# Patient Record
(demographics unavailable — no encounter records)

---

## 2020-01-01 NOTE — DIS
DATE OF ADMISSION:  2020



DATE OF DISCHARGE:  2020



RESIDENT:  Camila Garza DO.



DISCHARGE ATTENDING:  Nahid Henry MD.



CONSULTS:  None.



PROCEDURES:  Lumbar puncture.



PRIMARY DIAGNOSIS:   fever of unknown origin likely viral.



SECONDARY DIAGNOSIS:  Oral thrush.



DISCHARGE MEDICATIONS:  

1. Nystatin oral suspension 2 mL SSW q.i.d. 3 days.

2. Acetaminophen 50 mg p.o. q.4 hours. 



Discontinued Medications:  None.



HISTORY OF PRESENT ILLNESS/HOSPITAL COURSE:  The patient is a 16-day-old male who

presented with a chief complaint per mom with fever of 100.5 rectally at home,

noting patient was acting normal with normal p.o. intake, output and no symptoms.

The patient was born on 2020, at 0701 to a 22-year-old G4, P2-1-0-3 female via

 at 37.4 weeks gestational age.  Apgars were 8 and 9 and  course was

uneventful. 



In the ED, the patient was started on ampicillin, gentamicin. LP and blood culture

were done.  Chest x-ray, UA, and labs were all within normal limits.  Patient did

develop a fever overnight that was treated with Tylenol and remained afebrile the

rest of his stay.  The patient was given nystatin for oral thrush.  RVP was

negative.  Urine cultures, blood cultures, and CSF cultures were negative 48 hours

and antibiotics were stopped and the patient was discharged home with acetaminophen

p.r.n. and nystatin for 3 more days with followup with his primary care physician in

3 days. 



DISPOSITION:  Stable.



DISCHARGE INSTRUCTIONS:  Location:  Home. 



Diet:  Regular. 



Activity:  Ad bandar. 



Followup:  Follow up with your pediatrician in 3 days.







Job ID:  [id number]

## 2020-01-01 NOTE — PDOC.PED
Subjective:


Patient is resting comfortably in bed with mom. No acute events overnight. 

Feeding, voiding and stooling normally. Denies fever/chills, difficulty b

reathing.








Objective:


                             Vital Signs (12 hours)











  Temp Pulse Resp Pulse Ox


 


 10/14/20 05:57  98.7 F  152  50  98


 


 10/14/20 00:45  98.3 F  156  52  100


 


 10/13/20 19:00  98.5 F  144  64 H  99








                                     Weight











Weight                         3.26 kg














                                        











 10/12/20 10/13/20 10/14/20





 06:59 06:59 06:59


 


Intake Total  124 680


 


Output Total  64 510


 


Balance  60 170














Lab/Radiology


Result Diagrams: 


                                 10/12/20 22:01





                                 10/12/20 22:01


                             Lab Results - 24 Hours











  10/12/20 10/12/20 10/12/20





  23:25 23:25 22:46


 


Fluid Diff Path Review     


 


SARS-CoV-2 (PCR)    Not Detected








                                        











  10/12/20





  22:01


 


Total Bilirubin  5.4














Phys Exam





- Physical Examination


Constitutional: NAD


HEENT: PERRLA, moist MMs


Respiratory: no wheezing, clear to auscultation bilateral


Cardiovascular: RRR, no significant murmur


Gastrointestinal: soft, non-tender, positive bowel sounds


Musculoskeletal: no edema


Neurological: moves all 4 limbs


Skin: no rash





Assessment/Plan:


(1) Fever of unknown origin


Status: Acute   


#  fever 2/2 unknown source


-reported fever of 100.5 @ home per mom, 101.7 here initially, no fever >24 hrs


-UA, CXR neg


-RVP neg


-LP wnl


-CSF cultures, UCx, BCx negative so far, 48 hours tonight at 2325


-Ampicillin and Gentamicin give in ED (10/12), will continue pending results/s

ensitivities 


-Tylenol PRN fever


-Strict I/Os





#Thrush


-treating with Nystatin





Dispo: anticipated LOS >48 hours, likely d/c tomorrow morning


Diet: Formula, Similac Sensitive


Code: FULL


Fluids: KVO


PCP- [facility]








Addendum - Attending





- Attending Attestation


Date/Time: 10/14/20 9366





I personally evaluated the patient and discussed the management with Dr. Garza.


I agree with the History, Examination, Assessment and Plan documented above with

any addition or exceptions noted below.





continue abx until cx neg at 48 hrs. Doing well otherwise. no concerns. likely 

d/c tomorrow.

## 2020-01-01 NOTE — RAD
CHEST 2 VIEWS:

 

Date:  2020

 

HISTORY:  

Fever. 

 

FINDINGS:

The cardiothymic silhouette is within normal limits. Lungs are clear of any infiltrative process. No 
bony findings. 

 

IMPRESSION: 

No active intrathoracic disease. 

 

 

POS: OFF

## 2020-01-01 NOTE — PDOC.FPRHP
- History of Present Illness


Chief Complaint: fever


History of Present Illness: 





Pt is a 14 day old M who presents with chief complaint as per mom of fever prior

to arrival to ED. She states patient has been acting normal, with adequate PO 

intake, output, no rash, vomiting or diarrhea. The only thing she notes is that 

his tongue is white which started about a day ago. She just notes a rectal 

temperature of 100.5 which prompted her to come to the ED. She did not give 

patient any medication. Patient has 4 dirty diapers a day and 6 wet. He feeds 

2oz q2-3 hours of Similar Sensitive. Pregnancy was uncomplicated. Pt was born on

20 @ 0701 to a 23 yo  F via  at 37.4 wga. APGARS 8/9 and 

course was uneventful. 


ED Course: 





Ampicillin, Gentamicin, Tylenol


CXR neg, UA neg, labs WNL


LP and blood culture results pending





- Allergies/Adverse Reactions


                                    Allergies











Allergy/AdvReac Type Severity Reaction Status Date / Time


 


No Known Allergies Allergy   Verified 10/13/20 01:56














- Home Medications


                                        











 Medication  Instructions  Recorded  Confirmed  Type


 


No Known  09/29/20 10/13/20 History














- History


PMHx:


 see birth history above 





PSHx: 


negative





FHx:


 non contributory





Social:


 negative








- Review of Systems


General: reports: fever/chills.  denies: weight/appetite/sleep changes


ENT: denies: nasal congestion


Respiratory: denies: cough, congestion


Cardiovascular: denies: palpitation, edema


Gastrointestinal: denies: vomiting, diarrhea, GI bleeding


Skin: denies: rashes, lesions


Musculoskeletal: denies: stiffness, swelling


Neurological: denies: syncope, seizure





- Vital signs


HR: 148 RR: 48 Tmax: 98.8 Pox: 100% on RA  Wt: 3.27kg   








- Physical Exam


Constitutional: NAD, awake, alert and oriented, well developed


HEENT: normocephalic and atraumatic, EOMI, conjunctiva clear, TM's clear and 

intact, other (thrush on tongue)


Neck: supple


Heart: RRR, no murmurs/rubs/gallops


Lungs: CTAB, no respiratory distress, good air movement


Abdomen: soft, bowel sounds present, no masses/distention


Neurological: no focal deficit


Skin: no rash/lesions, good turgor


Heme/Lymphatic: no unusual bruising or bleeding





FMR H&P: Results





- Labs


Result Diagrams: 


                                 10/12/20 22:01





                                 10/12/20 22:


Lab results: 


                                        











WBC  9.6 thou/uL (9.0-30.0)   10/12/20  22:    


 


Hgb  17.0 g/dL (14.5-22.5)   10/12/20  22:    


 


Hct  50.1 % (44.0-64.0)   10/12/20  22:    


 


MCV  101.0 fL (96.0-116.0)   10/12/20  22:    


 


Plt Count  119 thou/uL (130-400)  L  10/12/20  22:    


 


Band Neuts % (Manual)  1 % (10-18)  L  10/12/20  22:    


 


Sodium  137 mmol/L (133-146)   10/12/20  22:    


 


Potassium  5.9 mmol/L (3.7-5.9)   10/12/20  22:    


 


Chloride  106 mmol/L ()   10/12/20  22:    


 


Carbon Dioxide  22 mmol/L (20-28)   10/12/20  22:    


 


BUN  8 mg/dL (5.1-16.8)   10/12/20  22:    


 


Creatinine  0.42 mg/dL (0.7-1.3)  L  10/12/20  22:    


 


Glucose  79 mg/dL (50-80)   10/12/20  22:    


 


Calcium  9.6 mg/dL (9.0-11.0)   10/12/20  22:    


 


Total Bilirubin  5.4 mg/dL (4.0-8.0)   10/12/20  22:    


 


AST  37 U/L (20-60)   10/12/20  22:    


 


ALT  26 U/L (8-55)   10/12/20  22:    


 


Alkaline Phosphatase  271 U/L (120-360)   10/12/20  22:    


 


C-Reactive Protein  Less than  0.50 mg/dL (= or < 0.5)   10/12/20  22:    


 


Serum Total Protein  5.8 g/dL (4.4-7.6)   10/12/20  22:    


 


Albumin  3.7 g/dL (3.8-5.4)  L  10/12/20  22:    


 


Urine Ketones  Negative mg/dL (Negative)   10/12/20  22:03    


 


Urine Blood  Negative  (Negative)   10/12/20  22:03    


 


Urine Nitrite  Negative  (Negative)   10/12/20  22:03    


 


Ur Leukocyte Esterase  Negative Stepan/uL (Negative)   10/12/20  22:03    














FMR H&P: A/P





- Plan





#  fever 2/2 unknown source


-reported fever of 100.5 per mom


-UA, CXR neg


-blood cultures and LP pending


-Ampicillin and Gentamicin give in ED (10/12), will continue pending 

results/sensitivities 


-Tylenol PRN fever





#Thrush


-treat with Nystatin





Dispo: admit to inpatient peds, anticipated LOS >48 hours


Diet: Formula, Similac Sensitive


Code: FULL


Fluids: KVO


PCP- [facility]





FMR H&P: Upper Level





- Plan


Date/Time: 10/13/20 0018








Lawson IBRAHIM DO, have evaluated this patient and agree with findings/plan as 

outlined by intern resident. Pertinent changes/additions are listed here.





14 day old M w/ no sig pm/birth hx presents for fever today at home. Mom has not

noticed any symptoms, change in IOs, no sick contacts. In the ED VSS except for 

fever, cbc, cmp, cxr, lp, ua were wnl. flu/rsv neg. cultures were obtained. 

given amp, gent, and tylenol. will admit to peds floor for  fever, 

continue broad spectrum abx for coverage of IBI, tylenol prn, no acute 

complications expected at this time, will await bcx results for further disposit

ion. ELOS>48hrs.  





Addendum - Attending





- Attending Attestation


Date/Time: 10/13/20 0971





I personally evaluated the patient and discussed the management with Dr. Borjas/Christian 


I agree with the History, Examination, Assessment and Plan documented above with

any addition or exceptions noted below.